# Patient Record
(demographics unavailable — no encounter records)

---

## 2024-12-06 NOTE — REASON FOR VISIT
[Initial Consultation] : an initial consultation for [Allergy Evaluation/ Skin Testing] : allergy evaluation and or skin testing [Congestion] : congestion [Runny Nose] : runny nose [Asthma] : asthma [Eczema] : eczema [Mother] : mother [Medical Records] : medical records

## 2024-12-06 NOTE — IMPRESSION
[Fractional of Exhaled Nitric Oxide ___] : Fractional of Exhaled Nitric Oxide [unfilled] [Normal] : normal [_____] : grasses ([unfilled]) [Allergy Testing Mixed Feathers] : feathers [Allergy Testing Cockroach] : cockroach [Allergy Testing Dog] : dog [Allergy Testing Cat] : cat [] : molds [Allergy Testing Trees] : trees [Allergy Testing Weeds] : weeds

## 2024-12-09 NOTE — HISTORY OF PRESENT ILLNESS
[Venom Reactions] : venom reactions [Food Allergies] : food allergies [Drug Allergies] : drug allergies [de-identified] : 15 year old male with asthma presents for initial allergy evaluation. Referred by Dr. Pena to assess for environmental allergies.  ASTHMA Followed by pulmonologist Dr. Pena, last visit 9/6/24.  -Last Spirometry (9/6/2024) demonstrates an FVC of 125%, FEV1 of 97%, FEV1/FVC ratio of 67, and an MNV04-52 of 62%. Started on inhaled corticosteroid (Symbicort 80/4.5 mcg/ACT) BID at that time. Isacc reports he is taking Symbicort BID with inhaler. Has not needed albuterol. He reports improved exercise tolerance. Has follow up with Dr. Pena 12/20, will have PFTs.   ENVIRONMENTAL ALLERGIES Reports congestion, cough, sneezing worse in the spring. Has used Flonase intermittently & finds it helpful. Has not trialed long acting daily antihistamine.  Reports frequent throat clearing, post nasal drip.  Reports mice in the home. No pets. No carpet but + rug in bedroom. No DM covers.    Eczema Currently well controlled with no flares.  For flares, uses a prescription cream (mom not sure of name) given by dermatologist. Flares are usually on trunk (front and back). Aveeno lotion to moisturize & Dove sensitive skin soap. Uses fabric softener & scented detergent

## 2024-12-09 NOTE — CONSULT LETTER
[Dear  ___] : Dear  [unfilled], [Courtesy Letter:] : I had the pleasure of seeing your patient, [unfilled], in my office today. [Please see my note below.] : Please see my note below. [Consult Closing:] : Thank you very much for allowing me to participate in the care of this patient.  If you have any questions, please do not hesitate to contact me. [Sincerely,] : Sincerely, [DrHarlan  ___] : Dr. FOX [FreeTextEntry3] : Kelly Rome MD  ___________________________________ Fellow, Division of Allergy and Immunology  [facility] [employer] at [address], [place] [address] Tel: [phone] Fax: [phone] Email: [email or web address]  Sumi Quijano MD Attending Physician  Division of Allergy/Immunology  [facility]    of Medicine and Pediatrics [facility] [place] [address] Tel: [phone] Fax: [phone] Email: [email or web address]

## 2024-12-09 NOTE — HISTORY OF PRESENT ILLNESS
[Venom Reactions] : venom reactions [Food Allergies] : food allergies [Drug Allergies] : drug allergies [de-identified] : 15 year old male with asthma presents for initial allergy evaluation. Referred by Dr. Pena to assess for environmental allergies.  ASTHMA Followed by pulmonologist Dr. Pena, last visit 9/6/24.  -Last Spirometry (9/6/2024) demonstrates an FVC of 125%, FEV1 of 97%, FEV1/FVC ratio of 67, and an TZK48-40 of 62%. Started on inhaled corticosteroid (Symbicort 80/4.5 mcg/ACT) BID at that time. Isacc reports he is taking Symbicort BID with inhaler. Has not needed albuterol. He reports improved exercise tolerance. Has follow up with Dr. Pena 12/20, will have PFTs.   ENVIRONMENTAL ALLERGIES Reports congestion, cough, sneezing worse in the spring. Has used Flonase intermittently & finds it helpful. Has not trialed long acting daily antihistamine.  Reports frequent throat clearing, post nasal drip.  Reports mice in the home. No pets. No carpet but + rug in bedroom. No DM covers.    Eczema Currently well controlled with no flares.  For flares, uses a prescription cream (mom not sure of name) given by dermatologist. Flares are usually on trunk (front and back). Aveeno lotion to moisturize & Dove sensitive skin soap. Uses fabric softener & scented detergent

## 2024-12-09 NOTE — PHYSICAL EXAM
[Alert] : alert [Well Nourished] : well nourished [Healthy Appearance] : healthy appearance [No Acute Distress] : no acute distress [Well Developed] : well developed [Normal Pupil & Iris Size/Symmetry] : normal pupil and iris size and symmetry [No Discharge] : no discharge [No Photophobia] : no photophobia [Sclera Not Icteric] : sclera not icteric [Normal TMs] : both tympanic membranes were normal [Normal Nasal Mucosa] : the nasal mucosa was normal [Normal Lips/Tongue] : the lips and tongue were normal [Normal Outer Ear/Nose] : the ears and nose were normal in appearance [Normal Tonsils] : normal tonsils [No Thrush] : no thrush [Pale mucosa] : pale mucosa [Boggy Nasal Turbinates] : boggy and/or pale nasal turbinates [Clear Rhinorrhea] : clear rhinorrhea was seen [Supple] : the neck was supple [Normal Rate and Effort] : normal respiratory rhythm and effort [No Crackles] : no crackles [No Retractions] : no retractions [Bilateral Audible Breath Sounds] : bilateral audible breath sounds [Normal Rate] : heart rate was normal  [Normal S1, S2] : normal S1 and S2 [No murmur] : no murmur [Regular Rhythm] : with a regular rhythm [Soft] : abdomen soft [Not Tender] : non-tender [Not Distended] : not distended [Normal Cervical Lymph Nodes] : cervical [Skin Intact] : skin intact  [No Rash] : no rash [No Skin Lesions] : no skin lesions [No clubbing] : no clubbing [No Edema] : no edema [No Cyanosis] : no cyanosis [Normal Mood] : mood was normal [Normal Affect] : affect was normal [Alert, Awake, Oriented as Age-Appropriate] : alert, awake, oriented as age appropriate [Wheezing] : no wheezing was heard

## 2024-12-09 NOTE — REVIEW OF SYSTEMS
[Nl] : Cardiovascular [FreeTextEntry4] : see HPI [FreeTextEntry6] : see HPI [de-identified] : see HPI [de-identified] : see HPI

## 2024-12-09 NOTE — REVIEW OF SYSTEMS
[Nl] : Cardiovascular [FreeTextEntry4] : see HPI [FreeTextEntry6] : see HPI [de-identified] : see HPI [de-identified] : see HPI

## 2024-12-09 NOTE — CONSULT LETTER
[Dear  ___] : Dear  [unfilled], [Courtesy Letter:] : I had the pleasure of seeing your patient, [unfilled], in my office today. [Please see my note below.] : Please see my note below. [Consult Closing:] : Thank you very much for allowing me to participate in the care of this patient.  If you have any questions, please do not hesitate to contact me. [Sincerely,] : Sincerely, [DrHarlan  ___] : Dr. FOX [FreeTextEntry3] : Kelly Rome MD  ___________________________________ Fellow, Division of Allergy and Immunology  [facility] at [address], [place] [address] Tel: [phone] Fax: [phone] Email: [email or web address]  Sumi Quijano MD Attending Physician  Division of Allergy/Immunology  [facility]    of Medicine and Pediatrics [facility] [place] [address] Tel: [phone] Fax: [phone] Email: [email or web address]

## 2024-12-20 NOTE — CONSULT LETTER
[Dear  ___] : Dear  [unfilled], [Consult Letter:] : I had the pleasure of evaluating your patient, [unfilled]. [Please see my note below.] : Please see my note below. [Consult Closing:] : Thank you very much for allowing me to participate in the care of this patient.  If you have any questions, please do not hesitate to contact me. [Sincerely,] : Sincerely, [FreeTextEntry3] : Vimal Pena MD [facility]

## 2024-12-20 NOTE — REVIEW OF SYSTEMS
[NI] : Allergic [Nl] : Endocrine [Snoring] : snoring [Wheezing] : wheezing [Cough] : cough [Eczema] : eczema [Immunizations are up to date] : Immunizations are up to date [Frequent Croup] : no frequent croup [Sinus Problems] : no sinus problems [Recurrent Ear Infections] : no recurrent ear infections [Pneumonia] : no pneumonia

## 2024-12-20 NOTE — HISTORY OF PRESENT ILLNESS
[FreeTextEntry1] : 12/2024 Interval History: Denies any issues since last visit. Recent ER visits/hospitalizations: denies Last oral steroid course: denies Recurrent cough or wheeze: denies except for throat clearing - mother states allergist thinks may be allergies Recurrent nocturnal cough or wheeze: denies Activity-induced symptoms: denies Daily meds: symbicort 80 mcg/ACT 2p bid - poor adherence Currently on claritin for allergies as per allergist Last used rescue: denies recent use  9/2024 - initial visit LUCRETIA ATKINSON is a 15 year M presenting for evaluation of asthma. Diagnosed with asthma when he was younger. Was on an inhaled corticosteroid when he was younger.   Respiratory Symptoms Chronic/Persistent daytime cough: daily - states he needs to clear his throat Chronic/Persistent nighttime cough: denies Wheezing: when he was younger Albuterol use: denies now, when younger Activity limitation: denies except for occasional dyspnea   Prior history Previously hospitalized: denies Last hospitalization: denies Prior PICU stay: denies Previously intubated: denies Prior courses of oral corticosteroids: yes when much younger ER visits for asthma: when much younger   Modified Asthma Predictive Index 4 or more wheezing episodes/year in the first three years of life: Major risk factors   Parental asthma: mom, brother   History of eczema: yes   Aeroallergen sensitization: unknown Minor risk factors   Food allergies: denies   Wheezing apart from colds: yes   Eosinophilia > 4%: unknown   Respiratory morbidity History of pneumonia: denies History of sinusitis: denies History of recurrent ear infections: denies Family history of CF, PCD, or other diseases of childhood: denies   Other co-morbidities MERLINE Symptoms: Occasional snoring. GERD: States he has felt heartburn before. No history of medical treatment for reflux. Dysphagia: No history of choking or gagging with feeds.   Birth history: FT, no respiratory issues after birth, no NICU Smokers in household: denies Grade: 10th grade Immunizations: UTD

## 2024-12-20 NOTE — PHYSICAL EXAM
[Well Nourished] : well nourished [Well Developed] : well developed [Alert] : ~L alert [Active] : active [Normal Breathing Pattern] : normal breathing pattern [No Respiratory Distress] : no respiratory distress [No Allergic Shiners] : no allergic shiners [No Drainage] : no drainage [No Conjunctivitis] : no conjunctivitis [Tympanic Membranes Clear] : tympanic membranes were clear [Nasal Mucosa Non-Edematous] : nasal mucosa non-edematous [No Nasal Drainage] : no nasal drainage [No Polyps] : no polyps [No Sinus Tenderness] : no sinus tenderness [No Oral Pallor] : no oral pallor [No Oral Cyanosis] : no oral cyanosis [Non-Erythematous] : non-erythematous [No Exudates] : no exudates [No Postnasal Drip] : no postnasal drip [No Tonsillar Enlargement] : no tonsillar enlargement [Absence Of Retractions] : absence of retractions [Symmetric] : symmetric [Good Expansion] : good expansion [No Acc Muscle Use] : no accessory muscle use [Good aeration to bases] : good aeration to bases [Equal Breath Sounds] : equal breath sounds bilaterally [No Crackles] : no crackles [No Rhonchi] : no rhonchi [Normal Sinus Rhythm] : normal sinus rhythm [No Heart Murmur] : no heart murmur [Soft, Non-Tender] : soft, non-tender [Non Distended] : was not ~L distended [Full ROM] : full range of motion [Capillary Refill < 2 secs] : capillary refill less than two seconds [No Cyanosis] : no cyanosis [No Kyphoscoliosis] : no kyphoscoliosis [No Contractures] : no contractures [Alert and  Oriented] : alert and oriented [No Abnormal Focal Findings] : no abnormal focal findings [Normal Muscle Tone And Reflexes] : normal muscle tone and reflexes [No Birth Marks] : no birth marks [No Rashes] : no rashes [No Skin Lesions] : no skin lesions [No Wheezing] : no wheezing [FreeTextEntry7] : decreased aeration with exhalation [de-identified] : ?clubbing index digit bilaterally

## 2024-12-20 NOTE — IMPRESSION
[Spirometry] : Spirometry [FreeTextEntry1] : Spirometry 12/20/24 demonstrates an FVC of 122%, FEV1 of 112%, FEV1/FVC ratio of 79, and an YQV99-66 of 90%. Spirometry (9/6/2024) demonstrates an FVC of 125%, FEV1 of 97%, FEV1/FVC ratio of 67, and an BAY05-76 of 62%.

## 2024-12-20 NOTE — IMPRESSION
[Spirometry] : Spirometry [FreeTextEntry1] : Spirometry 12/20/24 demonstrates an FVC of 122%, FEV1 of 112%, FEV1/FVC ratio of 79, and an UAW88-59 of 90%. Spirometry (9/6/2024) demonstrates an FVC of 125%, FEV1 of 97%, FEV1/FVC ratio of 67, and an AUW55-96 of 62%.

## 2024-12-20 NOTE — CONSULT LETTER
[Dear  ___] : Dear  [unfilled], [Consult Letter:] : I had the pleasure of evaluating your patient, [unfilled]. [Please see my note below.] : Please see my note below. [Consult Closing:] : Thank you very much for allowing me to participate in the care of this patient.  If you have any questions, please do not hesitate to contact me. [Sincerely,] : Sincerely, [FreeTextEntry3] : Vimal Pena MD [facility]

## 2024-12-20 NOTE — IMPRESSION
[Spirometry] : Spirometry [FreeTextEntry1] : Spirometry 12/20/24 demonstrates an FVC of 122%, FEV1 of 112%, FEV1/FVC ratio of 79, and an UNA45-62 of 90%. Spirometry (9/6/2024) demonstrates an FVC of 125%, FEV1 of 97%, FEV1/FVC ratio of 67, and an HCM39-18 of 62%.

## 2024-12-20 NOTE — ASSESSMENT
[FreeTextEntry1] : LUCRETIA ATKINSON is a 15 year M with previously diagnosed asthma presenting for initial pulmonary evaluation. Previously on an inhaled corticosteroid when younger, however over the last few years not on any medication. Risk factors for asthma include atopic dermatitis and extensive family history of asthma (including both brother and mother). Spirometry obtained is consistent with mild obstructive pattern and there is expiratory wheezing with forced exhalation noted on exam. Will initiate an inhaled corticosteroid at this time to decrease bronchial inflammation and control airway hyperreactivity. No confounders at this point such as sleep disordered breathing or SHUKRI. History, exam, trajectory or course are not supportive of alternative diagnoses such as CF, primary ciliary dyskinesia, immune deficiency, or congenital airway anomalies.   Based on the above assessment, my recommendations are as follows: 1. Take 2 puffs of Symbicort 80/4.5 mcg/ACT 2 times a day using your spacer +/- mask. 2. Take 2 puffs of albuterol 15-30 minutes before exercise via a spacer +/- mask only as needed. 3. Take 2 puffs of albuterol every 4-6 hours via a spacer +/- mask as needed for cough, wheezing, or shortness of breath. 4. Referral to Allergy for testing to identify potential triggers. 5. Return to clinic in 3 months, or sooner as needed.  Discussed above assessment, management plan, potential medication side effects and test results. Parent agreed with plan. All queries were answered.

## 2024-12-20 NOTE — PHYSICAL EXAM
[Well Nourished] : well nourished [Well Developed] : well developed [Alert] : ~L alert [Active] : active [Normal Breathing Pattern] : normal breathing pattern [No Respiratory Distress] : no respiratory distress [No Allergic Shiners] : no allergic shiners [No Drainage] : no drainage [No Conjunctivitis] : no conjunctivitis [Tympanic Membranes Clear] : tympanic membranes were clear [Nasal Mucosa Non-Edematous] : nasal mucosa non-edematous [No Nasal Drainage] : no nasal drainage [No Polyps] : no polyps [No Sinus Tenderness] : no sinus tenderness [No Oral Pallor] : no oral pallor [No Oral Cyanosis] : no oral cyanosis [Non-Erythematous] : non-erythematous [No Exudates] : no exudates [No Postnasal Drip] : no postnasal drip [No Tonsillar Enlargement] : no tonsillar enlargement [Absence Of Retractions] : absence of retractions [Symmetric] : symmetric [Good Expansion] : good expansion [No Acc Muscle Use] : no accessory muscle use [Good aeration to bases] : good aeration to bases [Equal Breath Sounds] : equal breath sounds bilaterally [No Crackles] : no crackles [No Rhonchi] : no rhonchi [Normal Sinus Rhythm] : normal sinus rhythm [No Heart Murmur] : no heart murmur [Soft, Non-Tender] : soft, non-tender [Non Distended] : was not ~L distended [Full ROM] : full range of motion [Capillary Refill < 2 secs] : capillary refill less than two seconds [No Cyanosis] : no cyanosis [No Kyphoscoliosis] : no kyphoscoliosis [No Contractures] : no contractures [Alert and  Oriented] : alert and oriented [No Abnormal Focal Findings] : no abnormal focal findings [Normal Muscle Tone And Reflexes] : normal muscle tone and reflexes [No Birth Marks] : no birth marks [No Rashes] : no rashes [No Skin Lesions] : no skin lesions [No Wheezing] : no wheezing [FreeTextEntry7] : decreased aeration with exhalation [de-identified] : ?clubbing index digit bilaterally

## 2024-12-20 NOTE — CONSULT LETTER
[Dear  ___] : Dear  [unfilled], [Consult Letter:] : I had the pleasure of evaluating your patient, [unfilled]. [Please see my note below.] : Please see my note below. [Consult Closing:] : Thank you very much for allowing me to participate in the care of this patient.  If you have any questions, please do not hesitate to contact me. [Sincerely,] : Sincerely, [FreeTextEntry3] : Vimal Pena MD [facility]

## 2024-12-31 NOTE — CONSULT LETTER
[Dear  ___] : Dear  [unfilled], [Consult Letter:] : I had the pleasure of evaluating your patient, [unfilled]. [Please see my note below.] : Please see my note below. [Consult Closing:] : Thank you very much for allowing me to participate in the care of this patient.  If you have any questions, please do not hesitate to contact me. [Sincerely,] : Sincerely, [FreeTextEntry2] : Dr Dominick Galloway [address] [place] [address] Phone: [phone] [FreeTextEntry3] :  Efren Mann MD  Division of Pediatric, General, Thoracic, and Endoscopic Surgery  [facility]

## 2024-12-31 NOTE — CONSULT LETTER
[Dear  ___] : Dear  [unfilled], [Consult Letter:] : I had the pleasure of evaluating your patient, [unfilled]. [Please see my note below.] : Please see my note below. [Consult Closing:] : Thank you very much for allowing me to participate in the care of this patient.  If you have any questions, please do not hesitate to contact me. [Sincerely,] : Sincerely, [FreeTextEntry2] : Dr Dominick Galloway [address] [place] [address] Phone: [phone] [FreeTextEntry3] :  Efren Mann MD  Division of Pediatric, General, Thoracic, and Endoscopic Surgery  [facility]

## 2024-12-31 NOTE — HISTORY OF PRESENT ILLNESS
[FreeTextEntry1] : Isacc is a 15-year-old boy who is here today with an umbilical hernia.  Mom says she has noticed this since birth.  Isacc continues to see a bulge at the site, but he does not have any pain at the site.  He is eating well without any emesis.  He denies any overlying skin changes.  They are here today to discuss surgical repair.

## 2024-12-31 NOTE — REASON FOR VISIT
[Initial - Scheduled] : an initial, scheduled visit with concerns of [Umbilical hernia] : umbilical hernia  [Patient] : patient [Mother] : mother [FreeTextEntry4] : Dr Dominick Galloway

## 2024-12-31 NOTE — ASSESSMENT
[FreeTextEntry1] : Isacc is a 15-year-old male who has an umbilical hernia. He is asymptomatic at this time. I educated mom and Isacc about this diagnosis and offered reassurance.  I discussed treatment options with them including continued observation versus umbilical hernia repair.  Now that he is 15 years old, they understand it is unlikely that it will close spontaneously at this point.  I reviewed the indications, risks, benefits and alternatives of umbilical hernia repair.  The risks discussed included but were not limited to bleeding, infection, injury to intra-abdominal contents, hernia recurrence, etc.  I reviewed postoperative expectations.  I counselled them about the possibility of developing an incarcerated hernia and they know to come to the ER should he have any concerning symptoms.  They are in agreement to proceed with repair.  We have scheduled his procedure in the upcoming weeks.  I will follow up with Dr Pena to assure he is optimized for his asthma.  Mom knows to contact me sooner with any further questions or concerns.

## 2025-03-03 NOTE — REASON FOR VISIT
[____ Week(s)] : [unfilled] week(s)  [Umbilical hernia repair] : umbilical hernia repair [Pain] : ~He/She~ does not have pain [Fever] : ~He/She~ does not have fever [Normal bowel movements] : ~He/She~ has normal bowel movements [Vomiting] : ~He/She~ does not have vomiting [Tolerating Diet] : ~He/She~ is tolerating diet [Redness at incision] : ~He/She~ does not have redness at incision [Drainage at incision] : ~He/She~ does not have drainage at incision [Swelling at surgical site] : ~He/She~ does not have swelling at surgical site [de-identified] : 2/10/24 [de-identified] : Dr. Mann

## 2025-03-03 NOTE — ASSESSMENT
[FreeTextEntry1] : LUCRETIA is a 15-year-old boy who is now s/p umbilical hernia repair with Dr. Mann on 2/18 and presents today for a routine post op visit. He has recovered well from his surgery.  ON exam his incision is well healed.  Dr. Mann in to see and examine the patient and is pleased with his recovery.  Parent and patient counseled of the small chance of recurrence.  Cleared to resume full physical activities although advised to refrain from heavy weightlifting for another few weeks.    Follow up with the pediatrician as usual and with pediatric surgery should any new or concerning symptoms arise. All questions answered.

## 2025-03-28 NOTE — CONSULT LETTER
[Dear  ___] : Dear  [unfilled], [Courtesy Letter:] : I had the pleasure of seeing your patient, [unfilled], in my office today. [Please see my note below.] : Please see my note below. [Consult Closing:] : Thank you very much for allowing me to participate in the care of this patient.  If you have any questions, please do not hesitate to contact me. [Sincerely,] : Sincerely, [FreeTextEntry3] : Deb Edwards PA-C Physician Assistant Pediatric Surgery [phone]

## 2025-03-28 NOTE — ASSESSMENT
[FreeTextEntry1] : Isacc is a 15y M with hx of umbilical hernia s/p umbilical hernia repair on 2/18/25. He recovered appropriately. He presented today for c/o swelling to the L of the umbilicus after playing sports. He has no signs of infection or wound complications on exam. Reassurance provided to family. Recommended trial of warm compresses if needed. Counseled on signs/symptoms of infections however at this point in the postoperative period it is extremely unlikely to develop an infection. There is no need to restrict from physical activities at this point. He can follow up with us as needed.

## 2025-03-28 NOTE — PHYSICAL EXAM
[NL] : grossly intact [FreeTextEntry1] : Incision completely healed. No umbilical hernia. Pt points to L rectus muscle as area of swelling. No fluctuance or induration. With valsalva maneuver, no bulges or change in the rectus muscles.

## 2025-03-28 NOTE — HISTORY OF PRESENT ILLNESS
[FreeTextEntry1] : Isacc is a 16yo M s/p umbilical hernia repair on 2/18/25 with Dr. Mann. He was seen for postop visit 3/3/25. He presents today for evaluation after he noticed swelling on the L side of the umbilicus after playing sports a few days ago. He denies pain, drainage from the incision, erythema, or warmth. He states it just felt swollen. Normal PO intake, normal BM.

## 2025-06-14 NOTE — PHYSICAL EXAM
[Well Developed] : well developed [Well Nourished] : well nourished [Active] : active [Alert] : ~L alert [No Respiratory Distress] : no respiratory distress [Normal Breathing Pattern] : normal breathing pattern [No Allergic Shiners] : no allergic shiners [No Drainage] : no drainage [No Conjunctivitis] : no conjunctivitis [Nasal Mucosa Non-Edematous] : nasal mucosa non-edematous [No Nasal Drainage] : no nasal drainage [No Polyps] : no polyps [No Sinus Tenderness] : no sinus tenderness [No Oral Pallor] : no oral pallor [No Oral Cyanosis] : no oral cyanosis [Non-Erythematous] : non-erythematous [No Exudates] : no exudates [No Postnasal Drip] : no postnasal drip [No Tonsillar Enlargement] : no tonsillar enlargement [Absence Of Retractions] : absence of retractions [Symmetric] : symmetric [Good Expansion] : good expansion [No Acc Muscle Use] : no accessory muscle use [Equal Breath Sounds] : equal breath sounds bilaterally [No Crackles] : no crackles [No Rhonchi] : no rhonchi [No Wheezing] : no wheezing [Normal Sinus Rhythm] : normal sinus rhythm [No Heart Murmur] : no heart murmur [Soft, Non-Tender] : soft, non-tender [Non Distended] : was not ~L distended [Full ROM] : full range of motion [Capillary Refill < 2 secs] : capillary refill less than two seconds [No Cyanosis] : no cyanosis [No Kyphoscoliosis] : no kyphoscoliosis [No Contractures] : no contractures [No Abnormal Focal Findings] : no abnormal focal findings [Alert and  Oriented] : alert and oriented [Normal Muscle Tone And Reflexes] : normal muscle tone and reflexes [No Birth Marks] : no birth marks [No Skin Lesions] : no skin lesions [No Rashes] : no rashes [FreeTextEntry3] : external exam normal [FreeTextEntry7] : decreased aeration with exhalation [de-identified] : ?clubbing index digit bilaterally

## 2025-06-14 NOTE — PHYSICAL EXAM
[Well Nourished] : well nourished [Well Developed] : well developed [Alert] : ~L alert [Active] : active [No Respiratory Distress] : no respiratory distress [Normal Breathing Pattern] : normal breathing pattern [No Allergic Shiners] : no allergic shiners [No Drainage] : no drainage [No Conjunctivitis] : no conjunctivitis [Nasal Mucosa Non-Edematous] : nasal mucosa non-edematous [No Nasal Drainage] : no nasal drainage [No Polyps] : no polyps [No Sinus Tenderness] : no sinus tenderness [No Oral Pallor] : no oral pallor [No Oral Cyanosis] : no oral cyanosis [Non-Erythematous] : non-erythematous [No Exudates] : no exudates [No Postnasal Drip] : no postnasal drip [No Tonsillar Enlargement] : no tonsillar enlargement [Absence Of Retractions] : absence of retractions [Symmetric] : symmetric [Good Expansion] : good expansion [No Acc Muscle Use] : no accessory muscle use [Equal Breath Sounds] : equal breath sounds bilaterally [No Crackles] : no crackles [No Rhonchi] : no rhonchi [No Wheezing] : no wheezing [Normal Sinus Rhythm] : normal sinus rhythm [No Heart Murmur] : no heart murmur [Soft, Non-Tender] : soft, non-tender [Non Distended] : was not ~L distended [Full ROM] : full range of motion [Capillary Refill < 2 secs] : capillary refill less than two seconds [No Cyanosis] : no cyanosis [No Kyphoscoliosis] : no kyphoscoliosis [No Contractures] : no contractures [No Abnormal Focal Findings] : no abnormal focal findings [Alert and  Oriented] : alert and oriented [Normal Muscle Tone And Reflexes] : normal muscle tone and reflexes [No Birth Marks] : no birth marks [No Rashes] : no rashes [No Skin Lesions] : no skin lesions [FreeTextEntry3] : external exam normal [FreeTextEntry7] : decreased aeration with exhalation [de-identified] : ?clubbing index digit bilaterally

## 2025-06-14 NOTE — PHYSICAL EXAM
[Well Developed] : well developed [Well Nourished] : well nourished [Active] : active [Alert] : ~L alert [No Respiratory Distress] : no respiratory distress [Normal Breathing Pattern] : normal breathing pattern [No Allergic Shiners] : no allergic shiners [No Drainage] : no drainage [No Conjunctivitis] : no conjunctivitis [No Nasal Drainage] : no nasal drainage [Nasal Mucosa Non-Edematous] : nasal mucosa non-edematous [No Polyps] : no polyps [No Sinus Tenderness] : no sinus tenderness [No Oral Pallor] : no oral pallor [No Oral Cyanosis] : no oral cyanosis [Non-Erythematous] : non-erythematous [No Exudates] : no exudates [No Postnasal Drip] : no postnasal drip [No Tonsillar Enlargement] : no tonsillar enlargement [Absence Of Retractions] : absence of retractions [Symmetric] : symmetric [Good Expansion] : good expansion [No Acc Muscle Use] : no accessory muscle use [Equal Breath Sounds] : equal breath sounds bilaterally [No Crackles] : no crackles [No Rhonchi] : no rhonchi [No Wheezing] : no wheezing [Normal Sinus Rhythm] : normal sinus rhythm [No Heart Murmur] : no heart murmur [Soft, Non-Tender] : soft, non-tender [Non Distended] : was not ~L distended [Full ROM] : full range of motion [Capillary Refill < 2 secs] : capillary refill less than two seconds [No Cyanosis] : no cyanosis [No Kyphoscoliosis] : no kyphoscoliosis [No Contractures] : no contractures [Alert and  Oriented] : alert and oriented [No Abnormal Focal Findings] : no abnormal focal findings [Normal Muscle Tone And Reflexes] : normal muscle tone and reflexes [No Birth Marks] : no birth marks [No Rashes] : no rashes [No Skin Lesions] : no skin lesions [FreeTextEntry3] : external exam normal [FreeTextEntry7] : decreased aeration with exhalation [de-identified] : ?clubbing index digit bilaterally

## 2025-06-14 NOTE — CONSULT LETTER
[Dear  ___] : Dear  [unfilled], [Courtesy Letter:] : I had the pleasure of seeing your patient, [unfilled], in my office today. [Please see my note below.] : Please see my note below. [Consult Closing:] : Thank you very much for allowing me to participate in the care of this patient.  If you have any questions, please do not hesitate to contact me. [Sincerely,] : Sincerely, [FreeTextEntry3] : Vimal Pena MD [facility]

## 2025-06-14 NOTE — CONSULT LETTER
[Dear  ___] : Dear  [unfilled], [Courtesy Letter:] : I had the pleasure of seeing your patient, [unfilled], in my office today. [Please see my note below.] : Please see my note below. [Consult Closing:] : Thank you very much for allowing me to participate in the care of this patient.  If you have any questions, please do not hesitate to contact me. [Sincerely,] : Sincerely, [FreeTextEntry3] : Vimal Pena MD [facility]

## 2025-06-14 NOTE — CONSULT LETTER
[Dear  ___] : Dear  [unfilled], [Courtesy Letter:] : I had the pleasure of seeing your patient, [unfilled], in my office today. [Please see my note below.] : Please see my note below. [Consult Closing:] : Thank you very much for allowing me to participate in the care of this patient.  If you have any questions, please do not hesitate to contact me. [Sincerely,] : Sincerely, [FreeTextEntry3] : Vimal Pena MD [facility]

## 2025-06-14 NOTE — HISTORY OF PRESENT ILLNESS
[FreeTextEntry1] : 6/2025 Interval History:  Recent ER visits/hospitalizations: 2/10/2025 Had hernia repair at [facility] [place]. Took Prednisone 40mg as prescribed for 2 days prior to surgery 2/22/2025 had allergic reaction to amoxicillin, admitted to [place] for swollen tongue for 1 day then discharged home Last oral steroid course: none Recurrent cough or wheeze: none Recurrent nocturnal cough or wheeze: none Activity-induced symptoms: gets SOB while playing Lacrosse Daily meds: Symbicort 80 2p BID -poor adherence, Montelukast QHS Medication adherence: poor Last used rescue: denies since last visit  12/2024 Interval History: Denies any issues since last visit. Recent ER visits/hospitalizations: denies Last oral steroid course: denies Recurrent cough or wheeze: denies except for throat clearing - mother states allergist thinks may be allergies Recurrent nocturnal cough or wheeze: denies Activity-induced symptoms: denies Daily meds: symbicort 80 mcg/ACT 2p bid - poor adherence Currently on claritin for allergies as per allergist Last used rescue: denies recent use  9/2024 - initial visit LUCRETIA ATKINSON is a 15 year M presenting for evaluation of asthma. Diagnosed with asthma when he was younger. Was on an inhaled corticosteroid when he was younger.   Respiratory Symptoms Chronic/Persistent daytime cough: daily - states he needs to clear his throat Chronic/Persistent nighttime cough: denies Wheezing: when he was younger Albuterol use: denies now, when younger Activity limitation: denies except for occasional dyspnea   Prior history Previously hospitalized: denies Last hospitalization: denies Prior PICU stay: denies Previously intubated: denies Prior courses of oral corticosteroids: yes when much younger ER visits for asthma: when much younger   Modified Asthma Predictive Index 4 or more wheezing episodes/year in the first three years of life: Major risk factors   Parental asthma: mom, brother   History of eczema: yes   Aeroallergen sensitization: unknown Minor risk factors   Food allergies: denies   Wheezing apart from colds: yes   Eosinophilia > 4%: unknown   Respiratory morbidity History of pneumonia: denies History of sinusitis: denies History of recurrent ear infections: denies Family history of CF, PCD, or other diseases of childhood: denies   Other co-morbidities MERLINE Symptoms: Occasional snoring. GERD: States he has felt heartburn before. No history of medical treatment for reflux. Dysphagia: No history of choking or gagging with feeds.   Birth history: FT, no respiratory issues after birth, no NICU Smokers in household: denies Grade: 10th grade Immunizations: UTD

## 2025-06-14 NOTE — HISTORY OF PRESENT ILLNESS
[FreeTextEntry1] : 6/2025 Interval History:  Recent ER visits/hospitalizations: 2/10/2025 Had hernia repair at [facility] [place]. Took Prednisone 40mg as prescribed for 2 days prior to surgery 2/22/2025 had allergic reaction to amoxicillin, admitted to [place] for swollen tongue for 1 day then discharged home Last oral steroid course: none Recurrent cough or wheeze: none Recurrent nocturnal cough or wheeze: none Activity-induced symptoms: gets SOB while playing Lacrosse Daily meds: Symbicort 80 2p BID -poor adherence, Montelukast QHS Medication adherence: poor Last used rescue: denies since last visit  12/2024 Interval History: Denies any issues since last visit. Recent ER visits/hospitalizations: denies Last oral steroid course: denies Recurrent cough or wheeze: denies except for throat clearing - mother states allergist thinks may be allergies Recurrent nocturnal cough or wheeze: denies Activity-induced symptoms: denies Daily meds: symbicort 80 mcg/ACT 2p bid - poor adherence Currently on claritin for allergies as per allergist Last used rescue: denies recent use  9/2024 - initial visit LUCRETIA ATKINSON is a 15 year M presenting for evaluation of asthma. Diagnosed with asthma when he was younger. Was on an inhaled corticosteroid when he was younger.   Respiratory Symptoms Chronic/Persistent daytime cough: daily - states he needs to clear his throat Chronic/Persistent nighttime cough: denies Wheezing: when he was younger Albuterol use: denies now, when younger Activity limitation: denies except for occasional dyspnea   Prior history Previously hospitalized: denies Last hospitalization: denies Prior PICU stay: denies Previously intubated: denies Prior courses of oral corticosteroids: yes when much younger ER visits for asthma: when much younger   Modified Asthma Predictive Index 4 or more wheezing episodes/year in the first three years of life: Major risk factors   Parental asthma: mom, brother   History of eczema: yes   Aeroallergen sensitization: unknown Minor risk factors   Food allergies: denies   Wheezing apart from colds: yes   Eosinophilia > 4%: unknown   Respiratory morbidity History of pneumonia: denies History of sinusitis: denies History of recurrent ear infections: denies Family history of CF, PCD, or other diseases of childhood: denies   Other co-morbidities MERLINE Symptoms: Occasional snoring. GERD: States he has felt heartburn before. No history of medical treatment for reflux. Dysphagia: No history of choking or gagging with feeds.   Birth history: FT, no respiratory issues after birth, no NICU Smokers in household: denies Grade: 10th grade Immunizations: UTD social

## 2025-06-14 NOTE — ASSESSMENT
[FreeTextEntry1] : LUCRETIA ATKINSON is a 16 year M with previously diagnosed asthma presenting for follow up pulmonary evaluation. Previously on an inhaled corticosteroid when younger, however over the last few years not on any medication. Risk factors for asthma include atopic dermatitis and extensive family history of asthma (including both brother and mother). Feeling well since last visit without recurrent cough or wheeze. Patient non-adherent to current medication regimen. Spirometry today demonstrates a mild obstructive pattern and lung exam notable for mildly hypoaerated lung sounds with expiration. Discussed trialing Breo Ellipta given its once daily dosing and patient agrees to try. No confounders at this point such as sleep disordered breathing or SHUKRI. History, exam, trajectory or course are not supportive of alternative diagnoses such as CF, primary ciliary dyskinesia, immune deficiency, or congenital airway anomalies.   Based on the above assessment, my recommendations are as follows: 1. Take Breo Ellipta 200 mcg/25 mcg/ACT 1 puff once daily. Rinse mouth out afterwards. 2. Start albuterol, 2-4 puffs via spacer every 4 - 6 hours as needed for cough, wheeze or difficulty breathing. 3. At the first sign of an illness, start albuterol 2-3x per day and increase as needed as per above. 4. Take 2 puffs of albuterol 15-30 minutes before exercise via a spacer +/- mask only as needed. 5. Return to clinic in 4 months, or sooner as needed.  Discussed above assessment, management plan, and test results. Parent agreed with plan. All queries were answered.

## 2025-06-14 NOTE — IMPRESSION
[Spirometry] : Spirometry [Mild] : (mild) [FreeTextEntry1] : Spirometry demonstrates an FVC of 128%, FEV1 of 111%, FEV1/FVC ratio of 75, and an UWI24-65 of 82%. Spirometry (9/6/2024) demonstrates an FVC of 125%, FEV1 of 97%, FEV1/FVC ratio of 67, and an RBH21-37 of 62%.

## 2025-06-14 NOTE — IMPRESSION
[Spirometry] : Spirometry [Mild] : (mild) [FreeTextEntry1] : Spirometry demonstrates an FVC of 128%, FEV1 of 111%, FEV1/FVC ratio of 75, and an ZXZ06-47 of 82%. Spirometry (9/6/2024) demonstrates an FVC of 125%, FEV1 of 97%, FEV1/FVC ratio of 67, and an UMV04-21 of 62%.

## 2025-06-14 NOTE — PHYSICAL EXAM
[Well Developed] : well developed [Well Nourished] : well nourished [Active] : active [Alert] : ~L alert [Normal Breathing Pattern] : normal breathing pattern [No Respiratory Distress] : no respiratory distress [No Allergic Shiners] : no allergic shiners [No Drainage] : no drainage [No Conjunctivitis] : no conjunctivitis [Nasal Mucosa Non-Edematous] : nasal mucosa non-edematous [No Nasal Drainage] : no nasal drainage [No Polyps] : no polyps [No Sinus Tenderness] : no sinus tenderness [No Oral Pallor] : no oral pallor [No Oral Cyanosis] : no oral cyanosis [Non-Erythematous] : non-erythematous [No Exudates] : no exudates [No Postnasal Drip] : no postnasal drip [No Tonsillar Enlargement] : no tonsillar enlargement [Absence Of Retractions] : absence of retractions [Symmetric] : symmetric [Good Expansion] : good expansion [No Acc Muscle Use] : no accessory muscle use [Equal Breath Sounds] : equal breath sounds bilaterally [No Crackles] : no crackles [No Rhonchi] : no rhonchi [No Wheezing] : no wheezing [Normal Sinus Rhythm] : normal sinus rhythm [No Heart Murmur] : no heart murmur [Soft, Non-Tender] : soft, non-tender [Non Distended] : was not ~L distended [Full ROM] : full range of motion [Capillary Refill < 2 secs] : capillary refill less than two seconds [No Cyanosis] : no cyanosis [No Kyphoscoliosis] : no kyphoscoliosis [No Contractures] : no contractures [Alert and  Oriented] : alert and oriented [No Abnormal Focal Findings] : no abnormal focal findings [Normal Muscle Tone And Reflexes] : normal muscle tone and reflexes [No Birth Marks] : no birth marks [No Skin Lesions] : no skin lesions [No Rashes] : no rashes [FreeTextEntry3] : external exam normal [FreeTextEntry7] : decreased aeration with exhalation [de-identified] : ?clubbing index digit bilaterally

## 2025-06-14 NOTE — IMPRESSION
[Spirometry] : Spirometry [Mild] : (mild) [FreeTextEntry1] : Spirometry demonstrates an FVC of 128%, FEV1 of 111%, FEV1/FVC ratio of 75, and an QTD15-34 of 82%. Spirometry (9/6/2024) demonstrates an FVC of 125%, FEV1 of 97%, FEV1/FVC ratio of 67, and an CPW87-96 of 62%.

## 2025-06-14 NOTE — REVIEW OF SYSTEMS
[NI] : Allergic [Nl] : Endocrine [Eczema] : eczema [Immunizations are up to date] : Immunizations are up to date [Frequent Croup] : no frequent croup [Sinus Problems] : no sinus problems [Recurrent Ear Infections] : no recurrent ear infections [Wheezing] : no wheezing [Cough] : no cough [Pneumonia] : no pneumonia

## 2025-06-14 NOTE — IMPRESSION
[Spirometry] : Spirometry [Mild] : (mild) [FreeTextEntry1] : Spirometry demonstrates an FVC of 128%, FEV1 of 111%, FEV1/FVC ratio of 75, and an ALO74-32 of 82%. Spirometry (9/6/2024) demonstrates an FVC of 125%, FEV1 of 97%, FEV1/FVC ratio of 67, and an URQ26-53 of 62%.

## 2025-06-14 NOTE — CONSULT LETTER
[Dear  ___] : Dear  [unfilled], [Courtesy Letter:] : I had the pleasure of seeing your patient, [unfilled], in my office today. [Please see my note below.] : Please see my note below. [Consult Closing:] : Thank you very much for allowing me to participate in the care of this patient.  If you have any questions, please do not hesitate to contact me. [Sincerely,] : Sincerely, [FreeTextEntry3] : Vimal Pena MD [facility]

## 2025-06-14 NOTE — HISTORY OF PRESENT ILLNESS
[FreeTextEntry1] : 6/2025 Interval History:  Recent ER visits/hospitalizations: 2/10/2025 Had hernia repair at [place]. Took Prednisone 40mg as prescribed for 2 days prior to surgery 2/22/2025 had allergic reaction to amoxicillin, admitted to [place] for swollen tongue for 1 day then discharged home Last oral steroid course: none Recurrent cough or wheeze: none Recurrent nocturnal cough or wheeze: none Activity-induced symptoms: gets SOB while playing Lacrosse Daily meds: Symbicort 80 2p BID -poor adherence, Montelukast QHS Medication adherence: poor Last used rescue: denies since last visit  12/2024 Interval History: Denies any issues since last visit. Recent ER visits/hospitalizations: denies Last oral steroid course: denies Recurrent cough or wheeze: denies except for throat clearing - mother states allergist thinks may be allergies Recurrent nocturnal cough or wheeze: denies Activity-induced symptoms: denies Daily meds: symbicort 80 mcg/ACT 2p bid - poor adherence Currently on claritin for allergies as per allergist Last used rescue: denies recent use  9/2024 - initial visit LUCRETIA ATKINSON is a 15 year M presenting for evaluation of asthma. Diagnosed with asthma when he was younger. Was on an inhaled corticosteroid when he was younger.   Respiratory Symptoms Chronic/Persistent daytime cough: daily - states he needs to clear his throat Chronic/Persistent nighttime cough: denies Wheezing: when he was younger Albuterol use: denies now, when younger Activity limitation: denies except for occasional dyspnea   Prior history Previously hospitalized: denies Last hospitalization: denies Prior PICU stay: denies Previously intubated: denies Prior courses of oral corticosteroids: yes when much younger ER visits for asthma: when much younger   Modified Asthma Predictive Index 4 or more wheezing episodes/year in the first three years of life: Major risk factors   Parental asthma: mom, brother   History of eczema: yes   Aeroallergen sensitization: unknown Minor risk factors   Food allergies: denies   Wheezing apart from colds: yes   Eosinophilia > 4%: unknown   Respiratory morbidity History of pneumonia: denies History of sinusitis: denies History of recurrent ear infections: denies Family history of CF, PCD, or other diseases of childhood: denies   Other co-morbidities MERLINE Symptoms: Occasional snoring. GERD: States he has felt heartburn before. No history of medical treatment for reflux. Dysphagia: No history of choking or gagging with feeds.   Birth history: FT, no respiratory issues after birth, no NICU Smokers in household: denies Grade: 10th grade Immunizations: UTD

## 2025-06-14 NOTE — HISTORY OF PRESENT ILLNESS
[FreeTextEntry1] : 6/2025 Interval History:  Recent ER visits/hospitalizations: 2/10/2025 Had hernia repair at [facility] [place]. Took Prednisone 40mg as prescribed for 2 days prior to surgery 2/22/2025 had allergic reaction to amoxicillin, admitted to [place] for swollen tongue for 1 day then discharged home Last oral steroid course: none Recurrent cough or wheeze: none Recurrent nocturnal cough or wheeze: none Activity-induced symptoms: gets SOB while playing Lacrosse Daily meds: Symbicort 80 2p BID -poor adherence, Montelukast QHS Medication adherence: poor Last used rescue: denies since last visit  12/2024 Interval History: Denies any issues since last visit. Recent ER visits/hospitalizations: denies Last oral steroid course: denies Recurrent cough or wheeze: denies except for throat clearing - mother states allergist thinks may be allergies Recurrent nocturnal cough or wheeze: denies Activity-induced symptoms: denies Daily meds: symbicort 80 mcg/ACT 2p bid - poor adherence Currently on claritin for allergies as per allergist Last used rescue: denies recent use  9/2024 - initial visit LUCRETIA ATKINSON is a 15 year M presenting for evaluation of asthma. Diagnosed with asthma when he was younger. Was on an inhaled corticosteroid when he was younger.   Respiratory Symptoms Chronic/Persistent daytime cough: daily - states he needs to clear his throat Chronic/Persistent nighttime cough: denies Wheezing: when he was younger Albuterol use: denies now, when younger Activity limitation: denies except for occasional dyspnea   Prior history Previously hospitalized: denies Last hospitalization: denies Prior PICU stay: denies Previously intubated: denies Prior courses of oral corticosteroids: yes when much younger ER visits for asthma: when much younger   Modified Asthma Predictive Index 4 or more wheezing episodes/year in the first three years of life: Major risk factors   Parental asthma: mom, brother   History of eczema: yes   Aeroallergen sensitization: unknown Minor risk factors   Food allergies: denies   Wheezing apart from colds: yes   Eosinophilia > 4%: unknown   Respiratory morbidity History of pneumonia: denies History of sinusitis: denies History of recurrent ear infections: denies Family history of CF, PCD, or other diseases of childhood: denies   Other co-morbidities MERLINE Symptoms: Occasional snoring. GERD: States he has felt heartburn before. No history of medical treatment for reflux. Dysphagia: No history of choking or gagging with feeds.   Birth history: FT, no respiratory issues after birth, no NICU Smokers in household: denies Grade: 10th grade Immunizations: UTD